# Patient Record
Sex: MALE | Race: WHITE | NOT HISPANIC OR LATINO | URBAN - METROPOLITAN AREA
[De-identification: names, ages, dates, MRNs, and addresses within clinical notes are randomized per-mention and may not be internally consistent; named-entity substitution may affect disease eponyms.]

---

## 2018-03-21 ENCOUNTER — HISTORICAL (OUTPATIENT)
Dept: LAB | Facility: HOSPITAL | Age: 73
End: 2018-03-21

## 2018-03-21 LAB — PSA SERPL-MCNC: 1.52 NG/ML (ref 0–4)

## 2019-07-09 ENCOUNTER — HISTORICAL (OUTPATIENT)
Dept: RADIOLOGY | Facility: HOSPITAL | Age: 74
End: 2019-07-09

## 2019-10-02 ENCOUNTER — HISTORICAL (OUTPATIENT)
Dept: ADMINISTRATIVE | Facility: HOSPITAL | Age: 74
End: 2019-10-02

## 2019-11-05 ENCOUNTER — HISTORICAL (OUTPATIENT)
Dept: INTENSIVE CARE | Facility: HOSPITAL | Age: 74
End: 2019-11-05

## 2020-02-03 ENCOUNTER — HISTORICAL (OUTPATIENT)
Dept: LAB | Facility: HOSPITAL | Age: 75
End: 2020-02-03

## 2021-04-05 ENCOUNTER — HISTORICAL (OUTPATIENT)
Dept: ADMINISTRATIVE | Facility: HOSPITAL | Age: 76
End: 2021-04-05

## 2021-04-05 LAB — POTASSIUM SERPL-SCNC: 5 MMOL/L (ref 3.5–5.1)

## 2021-04-15 ENCOUNTER — HISTORICAL (OUTPATIENT)
Dept: ADMINISTRATIVE | Facility: HOSPITAL | Age: 76
End: 2021-04-15

## 2022-04-07 ENCOUNTER — HISTORICAL (OUTPATIENT)
Dept: ADMINISTRATIVE | Facility: HOSPITAL | Age: 77
End: 2022-04-07

## 2022-04-24 VITALS
DIASTOLIC BLOOD PRESSURE: 91 MMHG | WEIGHT: 177.56 LBS | HEIGHT: 72 IN | SYSTOLIC BLOOD PRESSURE: 162 MMHG | BODY MASS INDEX: 24.05 KG/M2

## 2024-03-21 DIAGNOSIS — M25.551 RIGHT HIP PAIN: Primary | ICD-10-CM

## 2024-04-22 ENCOUNTER — OFFICE VISIT (OUTPATIENT)
Dept: ORTHOPEDICS | Facility: CLINIC | Age: 79
End: 2024-04-22
Payer: MEDICARE

## 2024-04-22 VITALS
HEART RATE: 54 BPM | WEIGHT: 166 LBS | HEIGHT: 72 IN | BODY MASS INDEX: 22.48 KG/M2 | DIASTOLIC BLOOD PRESSURE: 74 MMHG | SYSTOLIC BLOOD PRESSURE: 126 MMHG

## 2024-04-22 DIAGNOSIS — M47.816 LUMBAR SPONDYLOSIS: ICD-10-CM

## 2024-04-22 DIAGNOSIS — M25.551 RIGHT HIP PAIN: Primary | ICD-10-CM

## 2024-04-22 DIAGNOSIS — M16.11 PRIMARY OSTEOARTHRITIS OF RIGHT HIP: ICD-10-CM

## 2024-04-22 PROCEDURE — 99204 OFFICE O/P NEW MOD 45 MIN: CPT | Mod: ,,, | Performed by: ORTHOPAEDIC SURGERY

## 2024-04-22 RX ORDER — PANTOPRAZOLE SODIUM 40 MG/1
40 TABLET, DELAYED RELEASE ORAL
COMMUNITY
Start: 2024-03-01

## 2024-04-22 RX ORDER — TRAZODONE HYDROCHLORIDE 100 MG/1
200 TABLET ORAL NIGHTLY
COMMUNITY
Start: 2024-01-22

## 2024-04-22 RX ORDER — LATANOPROST 50 UG/ML
1 SOLUTION/ DROPS OPHTHALMIC NIGHTLY
COMMUNITY
Start: 2024-04-03

## 2024-04-22 RX ORDER — BRIMONIDINE TARTRATE 1 MG/ML
1 SOLUTION/ DROPS OPHTHALMIC 2 TIMES DAILY
COMMUNITY
Start: 2024-04-18

## 2024-04-22 NOTE — PROGRESS NOTES
Subjective:    CC: Pain of the Right Hip (Right hip pain. Hip has been hurting since summer of 2023. Pt has had injections at [facility] in L1 and L5 and they didn't help, hip injection didn't help either. Pain is constant, worse in the morning and gets down to a 5 when he gets up and moving. Pain is dull and intense. Radiates into groin. )       HPI:  Patient comes in today for his 1st visit.  Patient would like another opinion for his right hip.  Patient states he has been having groin pain for over 1 year.  He denies any trauma or specific injury.  Patient does have extensive hardware in his lower back.  He has had recent back injections.  He has also had 2 recent intra-articular right hip injections.  He continues to have some groin pain.  He has also been seen by his urologist.  He has also been seen by a joint specialist for his hip as well.  He continues to have pain, worse in the morning.  He states it shoots into his groin at times.    ROS: Refer to HPI for pertinent ROS. All other 12 point systems negative.    Objective:  Vitals:    04/22/24 0800   BP: 126/74   Pulse: (!) 54   Weight: 75.3 kg (166 lb)   Height: 6' (1.829 m)        Physical Exam:  Patient is well-nourished developed male he is awake alert and oriented x3 skin apparent stress is pleasant and cooperative.  Examination of the right lower extremity compartment soft and warm.  Skin is intact.  There is no signs symptoms of DVT or infection.  He does have some anterior thigh and groin pain.  Negative Stinchfield exam he has no pain with log-rolling of the right hip has appropriate motion stable to stressing, neurovascular intact distally.  He walks with a appropriate gait.    Images:  Outside x-rays were reviewed. Images Reviewed and discussed with patient.    Assessment:  1. Right hip pain  - Ambulatory referral/consult to Orthopedics    2. Primary osteoarthritis of right hip    3. Lumbar spondylosis        Plan:  At this time we discussed his physical  exam and outside imaging.  We have discussed various treatment options.  We have discussed physical therapy, anti-inflammatories with appropriate precautions.  He will follow up with his back specialist as well.  We have also discussed additional referred pain possible lower abdomen area.  Patient understands to call or return sooner if there is any new problems or difficulties.    Follow UP: No follow-ups on file.